# Patient Record
Sex: FEMALE | Race: WHITE
[De-identification: names, ages, dates, MRNs, and addresses within clinical notes are randomized per-mention and may not be internally consistent; named-entity substitution may affect disease eponyms.]

---

## 2018-03-15 ENCOUNTER — HOSPITAL ENCOUNTER (EMERGENCY)
Dept: HOSPITAL 36 - ER | Age: 23
Discharge: LEFT BEFORE DISCHARGE | End: 2018-03-15
Payer: MEDICAID
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
## 2018-03-15 DIAGNOSIS — S39.91XA: ICD-10-CM
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
## 2018-03-15 DIAGNOSIS — O26.892: Primary | ICD-10-CM

## 2018-03-15 PROCEDURE — Z7502: HCPCS
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
